# Patient Record
Sex: FEMALE | Race: BLACK OR AFRICAN AMERICAN | NOT HISPANIC OR LATINO | Employment: UNEMPLOYED | URBAN - METROPOLITAN AREA
[De-identification: names, ages, dates, MRNs, and addresses within clinical notes are randomized per-mention and may not be internally consistent; named-entity substitution may affect disease eponyms.]

---

## 2017-04-07 ENCOUNTER — HOSPITAL ENCOUNTER (EMERGENCY)
Facility: OTHER | Age: 27
Discharge: HOME | End: 2017-04-07
Attending: EMERGENCY MEDICINE
Payer: MEDICAID

## 2017-04-07 VITALS
RESPIRATION RATE: 16 BRPM | DIASTOLIC BLOOD PRESSURE: 82 MMHG | OXYGEN SATURATION: 99 % | WEIGHT: 177 LBS | HEART RATE: 95 BPM | SYSTOLIC BLOOD PRESSURE: 140 MMHG | TEMPERATURE: 98 F | HEIGHT: 63 IN | BODY MASS INDEX: 31.36 KG/M2

## 2017-04-07 DIAGNOSIS — L20.9 ATOPIC DERMATITIS, UNSPECIFIED TYPE: Primary | ICD-10-CM

## 2017-04-07 LAB
B-HCG UR QL: NEGATIVE
CTP QC/QA: YES

## 2017-04-07 PROCEDURE — 99283 EMERGENCY DEPT VISIT LOW MDM: CPT | Mod: 25

## 2017-04-07 PROCEDURE — 81025 URINE PREGNANCY TEST: CPT | Performed by: EMERGENCY MEDICINE

## 2017-04-07 RX ORDER — TRIAMCINOLONE ACETONIDE 5 MG/G
OINTMENT TOPICAL 2 TIMES DAILY
Qty: 15 G | Status: SHIPPED | OUTPATIENT
Start: 2017-04-07 | End: 2017-04-17

## 2017-04-07 NOTE — ED PROVIDER NOTES
"Encounter Date: 4/7/2017    SCRIBE #1 NOTE: I, Spenser Salehinz, am scribing for, and in the presence of,  Dr. King. I have scribed the entire note.       History     Chief Complaint   Patient presents with    Rash     + dry rash noted to left 4th finger x 1 month. " I used the cream my primary doctor gave me and it has not helped". Denies fever or chills     Review of patient's allergies indicates:  No Known Allergies  HPI Comments: Time seen by provider: 5:07 PM    This is a 27 y.o. female who presents with complaint of a painful rash to her left ring finger starting 2 months ago. She quit wearing her ring after onset. She was prescribed a cream by her PCP that she used without improvement. She has not seen a dermatologist. The pain is worse with pressure and movement of her finger. There is occasional clear discharge from the site. She denies fever, numbness and weakness.    The history is provided by the patient.     Past Medical History:   Diagnosis Date    Herpes simplex virus (HSV) infection      Past Surgical History:   Procedure Laterality Date    CYST REMOVAL      right leg     History reviewed. No pertinent family history.  Social History   Substance Use Topics    Smoking status: Never Smoker    Smokeless tobacco: None    Alcohol use No     Review of Systems   Constitutional: Negative for chills, diaphoresis and fever.   HENT: Negative for congestion and sore throat.    Eyes: Negative for pain.   Respiratory: Negative for shortness of breath.    Cardiovascular: Negative for chest pain.   Gastrointestinal: Negative for diarrhea, nausea and vomiting.   Genitourinary: Negative for dysuria.   Musculoskeletal: Negative for myalgias.   Skin: Positive for rash.   Neurological: Negative for weakness, numbness and headaches.   Psychiatric/Behavioral: Negative for behavioral problems and confusion.       Physical Exam   Initial Vitals   BP Pulse Resp Temp SpO2   04/07/17 1642 04/07/17 1642 04/07/17 1642 " 04/07/17 1642 04/07/17 1642   140/82 95 16 97.6 °F (36.4 °C) 99 %     Physical Exam    Nursing note and vitals reviewed.  Constitutional: She appears well-developed and well-nourished. She is not diaphoretic. No distress.   HENT:   Head: Normocephalic and atraumatic.   Eyes: Right eye exhibits no discharge. Left eye exhibits no discharge.   Neck: Normal range of motion. Neck supple.   Cardiovascular: Normal rate, regular rhythm, normal heart sounds and intact distal pulses. Exam reveals no gallop and no friction rub.    No murmur heard.  Pulmonary/Chest: Breath sounds normal. No respiratory distress. She has no wheezes. She has no rhonchi. She has no rales.   Musculoskeletal: Normal range of motion. She exhibits no edema or tenderness.   Neurological: She is alert and oriented to person, place, and time. She has normal strength. No sensory deficit.   Skin: Skin is warm. No rash noted. No pallor.   Left ring finger with a cracked scaling rash along the dorsal aspect. No pustules. Increased scaling over the DIP of the right middle finger.    Psychiatric: She has a normal mood and affect. Her behavior is normal. Judgment and thought content normal.         ED Course   Procedures  Labs Reviewed   POCT URINE PREGNANCY             Medical Decision Making:   Clinical Tests:   Lab Tests: Ordered and Reviewed  ED Management:  #27-year-old female with rash to her left ring finger.  Appears to be eczema.  We'll treat with high-dose triamcinolone and refer to dermatology. Patient discharged home in stable condition. Diagnosis and treatment plan explained to patient. I have answered all questions and the patient is satisfied with the plan of care.            Scribe Attestation:   Scribe #1: I performed the above scribed service and the documentation accurately describes the services I performed. I attest to the accuracy of the note.    Attending Attestation:           Physician Attestation for Scribe:  Physician Attestation  Statement for Scribe #1: I, Dr. King, reviewed documentation, as scribed by Spenser Dumont in my presence, and it is both accurate and complete.                 ED Course     Clinical Impression:     1. Atopic dermatitis, unspecified type               Pranay King, DO  04/07/17 1805

## 2017-04-07 NOTE — ED TRIAGE NOTES
"Pt reports about 2.5 months ago when wearing her wedding ring she started noticing a dark colored, dry rash noted to left 4th finger that is itchy, inflamed, cracked and drains a clear liquid. " I used the cream my primary doctor gave me and it has not helped". Denies fever or chills; rash is starting to spread to left 5th finger and some areas to the right fingers  "

## 2017-04-07 NOTE — ED AVS SNAPSHOT
[facility]  [address]  [place] [address]               Gwendolyn Urbano   2017  4:53 PM   ED    Description:  Female : 1990   Department:  [facility]           Your Care was Coordinated By:     Provider Role From To    Pranay King DO Attending Provider 17 0796 --      Reason for Visit     Rash           Diagnoses this Visit        Comments    Atopic dermatitis, unspecified type    -  Primary       ED Disposition     None           To Do List           Follow-up Information     Follow up with [facility] - Dermatology In 1 week.    Specialty:  Dermatology    Contact information:    [address]  [facility] [address] [phone]    Additional information:    [facility] [place], [address]th Floor - [address]      Referral Details     Referred By    Referred To    Pranay King DO   [address] [place] [address] [address] [address]   Phone:  [phone]   Fax:  [phone]    Order:  Ambulatory Referral To Dermatology   Reason:  Specialty Services Required           These Medications        Disp Refills Start End    triamcinolone (KENALOG) 0.5 % ointment 15 g g 2017    Apply topically 2 (two) times daily. - Topical (Top)      [facility]     [facility] On Call Nurse Care Line -  Assistance  Unless otherwise directed by your provider, please contact [facility], our nurse care line that is available for  assistance.     Registered nurses in the [facility] provide: appointment scheduling, clinical advisement, health education, and other advisory services.  Call: [phone] (toll free)               Medications           Message regarding Medications     Verify the changes and/or additions to your medication regime listed below are the same as discussed with your clinician today.  If any of these changes or additions are incorrect, please notify your healthcare provider.       "  START taking these NEW medications        Refills    triamcinolone (KENALOG) 0.5 % ointment g    Sig: Apply topically 2 (two) times daily.    Class: Print    Route: Topical (Top)           Verify that the below list of medications is an accurate representation of the medications you are currently taking.  If none reported, the list may be blank. If incorrect, please contact your healthcare provider. Carry this list with you in case of emergency.           Current Medications     triamcinolone (KENALOG) 0.5 % ointment Apply topically 2 (two) times daily.           Clinical Reference Information           Your Vitals Were     BP Pulse Temp Resp Height Weight    140/82 (BP Location: Left arm, Patient Position: Sitting) 95 97.6 °F (36.4 °C) (Oral) 16 5' 3" (1.6 m) 80.3 kg (177 lb)    Last Period SpO2 BMI          02/28/2017 99% 31.35 kg/m2        Allergies as of 4/7/2017     No Known Allergies      Immunizations Administered on Date of Encounter - 4/7/2017     None      ED Micro, Lab, POCT     Start Ordered       Status Ordering Provider    04/07/17 1644 04/07/17 1643  POCT urine pregnancy  Once      Final result       ED Imaging Orders     None        Discharge Instructions       Put ointment on and then wear glove at night to increase the effectiveness. Follow up with dermatology.    Discharge References/Attachments     ATOPIC DERMATITIS (ECZEMA) (ENGLISH)      [facility] Sign-Up     Activating your [facility] account is as easy as 1-2-3!     1) Visit [email or web address], select Sign Up Now, enter this activation code and your date of birth, then select Next.  [email or web address]  Expires: 5/22/2017  5:17 PM      2) Create a username and password to use when you visit [facility] in the future and select a security question in case you lose your password and select Next.    3) Enter your e-mail address and click Sign Up!    Additional Information  If you have questions, please e-mail [email or web address] or call [phone] " to talk to our [facility] staff. Remember, [facility] is NOT to be used for urgent needs. For medical emergencies, dial 911.          [facility] complies with applicable Federal civil rights laws and does not discriminate on the basis of race, color, national origin, age, disability, or sex.        Language Assistance Services     ATTENTION: Language assistance services are available, free of charge. Please call [phone].      ATENCIÓN: Si habla español, tiene a wei disposición servicios gratuitos de asistencia lingüística. Llame al [phone].     CHÚ Ý: N?u b?n nói Ti?ng Vi?t, có các d?ch v? h? tr? ngôn ng? mi?n phí dành cho b?n. G?i s? [phone].

## 2017-04-07 NOTE — DISCHARGE INSTRUCTIONS
Put ointment on and then wear glove at night to increase the effectiveness. Follow up with dermatology.

## 2017-04-10 NOTE — PLAN OF CARE
ON 4/10 @ 12 NOON, CALLED TO CLARIFY ID [facility] ACCEPTED MEDICAID INSURANCE. THEY DO NOT PER THE OFFICE.     PT CALLED ME THIS MORNING AND STATED SHE CALLED [facility] THIS MORNING AND WAS GIVEN A FAX NUMBER [phone]. THIS FAX NUMBER IS FOR [facility]. INFORMED PATIENT OF THIS INFORMATION.     CALL [facility], THEY DO NOT HAVE THE REFERRAL. RE-FAXED THE REFERRAL AND INFORMED PATIENT THEY WOULD BE CALLING IN THE NEXT DAY OR SO WITH AN APPOINTMENT DATE/TIME.     NEXT AVAILABLE WITHOUT A REFERRAL WOULD BE AT THE END OF MAY.

## 2018-05-04 ENCOUNTER — HOSPITAL ENCOUNTER (EMERGENCY)
Facility: OTHER | Age: 28
Discharge: HOME | End: 2018-05-04
Attending: OBSTETRICS & GYNECOLOGY
Payer: MEDICAID

## 2018-05-04 VITALS
SYSTOLIC BLOOD PRESSURE: 114 MMHG | RESPIRATION RATE: 18 BRPM | HEART RATE: 79 BPM | TEMPERATURE: 98 F | OXYGEN SATURATION: 98 % | DIASTOLIC BLOOD PRESSURE: 85 MMHG

## 2018-05-04 DIAGNOSIS — S80.02XA CONTUSION OF LEFT KNEE, INITIAL ENCOUNTER: ICD-10-CM

## 2018-05-04 DIAGNOSIS — M25.562 LEFT KNEE PAIN: ICD-10-CM

## 2018-05-04 DIAGNOSIS — O09.33 LIMITED PRENATAL CARE IN THIRD TRIMESTER: Primary | ICD-10-CM

## 2018-05-04 PROCEDURE — 59025 FETAL NON-STRESS TEST: CPT

## 2018-05-04 PROCEDURE — 99283 EMERGENCY DEPT VISIT LOW MDM: CPT | Mod: 25,,, | Performed by: OBSTETRICS & GYNECOLOGY

## 2018-05-04 PROCEDURE — 99284 EMERGENCY DEPT VISIT MOD MDM: CPT | Mod: 25

## 2018-05-04 PROCEDURE — 59025 FETAL NON-STRESS TEST: CPT | Mod: 26,,, | Performed by: OBSTETRICS & GYNECOLOGY

## 2018-05-05 NOTE — ED PROVIDER NOTES
Encounter Date: 2018       History     Chief Complaint   Patient presents with    Knee Pain     Gwendolyn Urbano is a 28 y.o. [id number] at unknown [place] presents for knee pain since last night. Patient hit her knee on the side of her bed last night. Patient states since then she cannot bear weight on the leg. Denies any bruising. This IUP is complicated by lack of prental care, homebirth. Patient states she has not seen a physician this pregnancy. History of 2 prior SVDs at home. Denies PMH, PSH. Patient denies contractions, denies vaginal bleeding, denies LOF.   Fetal Movement: normal.          Review of patient's allergies indicates:  No Known Allergies  Past Medical History:   Diagnosis Date    Herpes simplex virus (HSV) infection      Past Surgical History:   Procedure Laterality Date    CYST REMOVAL      right leg     No family history on file.  Social History   Substance Use Topics    Smoking status: Never Smoker    Smokeless tobacco: Not on file    Alcohol use No     Review of Systems   Constitutional: Negative for chills, fatigue and fever.   HENT: Negative for congestion.    Eyes: Negative for visual disturbance.   Respiratory: Negative for cough and shortness of breath.    Cardiovascular: Negative for chest pain and palpitations.   Gastrointestinal: Negative for abdominal distention, constipation, diarrhea, nausea and vomiting.   Genitourinary: Negative for difficulty urinating, dysuria, hematuria, vaginal bleeding and vaginal discharge.   Musculoskeletal: Positive for joint swelling and myalgias.   Skin: Negative for rash.   Neurological: Negative for dizziness, seizures, light-headedness and headaches.   Hematological: Does not bruise/bleed easily.   Psychiatric/Behavioral: Negative for dysphoric mood. The patient is not nervous/anxious.        Physical Exam     Initial Vitals   BP Pulse Resp Temp SpO2   18 1848 18 1848 18 1848 18 1848 18 1850   114/85 100 18 98.1 °F  (36.7 °C) 98 %      MAP       05/04/18 1848       94.67         Physical Exam    Vitals reviewed.  Constitutional: She appears well-developed and well-nourished.   Cardiovascular: Normal rate and regular rhythm.   Pulmonary/Chest: Breath sounds normal. No respiratory distress.   Abdominal: Soft. She exhibits no distension. There is no tenderness.   Gravid     Musculoskeletal: Normal range of motion. She exhibits no edema.   Patient difficulty extending left knee can flex fully. Point tenderness to knee of lateral side   Neurological: She is alert and oriented to person, place, and time.   Skin: Skin is warm and dry.   Psychiatric: She has a normal mood and affect. Her behavior is normal. Judgment and thought content normal.         ED Course   Obtain Fetal nonstress test (NST)  Date/Time: 5/4/2018 7:42 PM  Performed by: PATRICIA DUTTA  Authorized by: PATRICIA DUTTA     Nonstress Test:     Variability:  6-25 BPM    Decelerations:  Variable    Accelerations:  15 bpm    Baseline:  140    Uterine Irritability: No      Contractions:  Not present  Biophysical Profile:     Nonstress Test Interpretation: reactive      Overall Impression:  Reassuring      Labs Reviewed - No data to display          Medical Decision Making:   ED Management:  NST reactive and reassuring  XRAY negative for any fracture  To f/u with ortho if continues to have pain or difficulty with bearing weight  Ice packs and tylenol discussed with patient  Patient refused all blood draws  Patient states she refuses because she plans on delivering at home  Discussed that we do not recommend that and that is against medical advice  Scan vertex, placenta fundal, 36wk by FL                      Clinical Impression:   The primary encounter diagnosis was Limited prenatal care in third trimester. Diagnoses of Left knee pain and Contusion of left knee, initial encounter were also pertinent to this visit.

## 2018-05-05 NOTE — ED NOTES
Pt given discharge instructions, verbally and written. Pt verbalized understanding. All questions answered, no further questions at this time. Pt discharged to home.

## 2022-10-02 ENCOUNTER — HOSPITAL ENCOUNTER (EMERGENCY)
Facility: OTHER | Age: 32
Discharge: HOME | End: 2022-10-02
Attending: EMERGENCY MEDICINE
Payer: MEDICAID

## 2022-10-02 VITALS
WEIGHT: 199.5 LBS | SYSTOLIC BLOOD PRESSURE: 120 MMHG | TEMPERATURE: 98 F | RESPIRATION RATE: 18 BRPM | DIASTOLIC BLOOD PRESSURE: 78 MMHG | BODY MASS INDEX: 35.34 KG/M2 | OXYGEN SATURATION: 99 % | HEART RATE: 78 BPM

## 2022-10-02 DIAGNOSIS — J01.00 ACUTE MAXILLARY SINUSITIS, RECURRENCE NOT SPECIFIED: Primary | ICD-10-CM

## 2022-10-02 DIAGNOSIS — Z20.828 EXPOSURE TO INFLUENZA: ICD-10-CM

## 2022-10-02 LAB
B-HCG UR QL: NEGATIVE
CTP QC/QA: YES
POC MOLECULAR INFLUENZA A AGN: NEGATIVE
POC MOLECULAR INFLUENZA B AGN: NEGATIVE
SARS-COV-2 RDRP RESP QL NAA+PROBE: NEGATIVE

## 2022-10-02 PROCEDURE — 81025 URINE PREGNANCY TEST: CPT | Performed by: NURSE PRACTITIONER

## 2022-10-02 PROCEDURE — 99284 EMERGENCY DEPT VISIT MOD MDM: CPT

## 2022-10-02 PROCEDURE — U0002 COVID-19 LAB TEST NON-CDC: HCPCS | Performed by: EMERGENCY MEDICINE

## 2022-10-02 RX ORDER — AMOXICILLIN AND CLAVULANATE POTASSIUM 875; 125 MG/1; MG/1
1 TABLET, FILM COATED ORAL EVERY 12 HOURS
Qty: 14 TABLET | Refills: 0 | Status: SHIPPED | OUTPATIENT
Start: 2022-10-02 | End: 2022-10-09

## 2022-10-02 RX ORDER — PROMETHAZINE HYDROCHLORIDE AND DEXTROMETHORPHAN HYDROBROMIDE 6.25; 15 MG/5ML; MG/5ML
5 SYRUP ORAL EVERY 6 HOURS PRN
Qty: 120 ML | Refills: 0 | Status: SHIPPED | OUTPATIENT
Start: 2022-10-02 | End: 2022-10-12

## 2022-10-02 RX ORDER — FLUTICASONE PROPIONATE 50 MCG
2 SPRAY, SUSPENSION (ML) NASAL DAILY
Qty: 9.9 ML | Refills: 0 | Status: SHIPPED | OUTPATIENT
Start: 2022-10-02

## 2022-10-03 NOTE — ED PROVIDER NOTES
Encounter Date: 10/2/2022       History     Chief Complaint   Patient presents with    Fever     C/o fever, cough, exposed to flu via children     Chief complaint:  Flu exposure    32-year-old female presents for evaluation of URI symptoms x1 week.  Children were exposed to fluid school.  Multiple family members also with URI symptoms and fever.  Patient reports nasal congestion, sinus pressure (worse on the left), runny nose, and cough.  Afebrile in triage.  No respiratory distress.    This is the extent of patient's complaints for this ER encounter.     The history is provided by the patient.   Review of patient's allergies indicates:  No Known Allergies  Past Medical History:   Diagnosis Date    Herpes simplex virus (HSV) infection      Past Surgical History:   Procedure Laterality Date    CYST REMOVAL      right leg     No family history on file.  Social History     Tobacco Use    Smoking status: Never   Substance Use Topics    Alcohol use: No    Drug use: No     Review of Systems   Constitutional:  Negative for fever.   HENT:  Positive for congestion, rhinorrhea and sinus pressure. Negative for sore throat.    Respiratory:  Positive for cough. Negative for shortness of breath.    Cardiovascular:  Negative for chest pain.   Gastrointestinal:  Negative for abdominal pain.   Genitourinary:  Negative for difficulty urinating.   Musculoskeletal:  Negative for arthralgias, back pain, myalgias and neck pain.   Skin:  Negative for rash and wound.   Neurological:  Negative for weakness and headaches.     Physical Exam     Initial Vitals [10/02/22 2040]   BP Pulse Resp Temp SpO2   120/82 84 18 98.2 °F (36.8 °C) 97 %      MAP       --         Physical Exam    Vitals reviewed.  Constitutional: No distress.   HENT:   Head: Normocephalic and atraumatic.   Right Ear: Tympanic membrane and ear canal normal.   Left Ear: Tympanic membrane and ear canal normal.   Nose: Rhinorrhea and sinus tenderness present. Left sinus exhibits  maxillary sinus tenderness.   Mouth/Throat: Mucous membranes are normal. Posterior oropharyngeal erythema (mild) present. No oropharyngeal exudate, posterior oropharyngeal edema or tonsillar abscesses.   Clear post nasal drip noted. Bilateral nasal mucosa with nasal discharge noted.   Eyes: Conjunctivae, EOM and lids are normal. Right pupil is round. Left pupil is round. Pupils are equal.   Cardiovascular:  Normal rate, regular rhythm and normal heart sounds.           Pulmonary/Chest: Effort normal and breath sounds normal. No respiratory distress.   Musculoskeletal:         General: Normal range of motion.     Neurological: She is alert and oriented to person, place, and time. She has normal strength.   Skin: Skin is warm and dry. No rash noted.   Psychiatric: She has a normal mood and affect. Her speech is normal and behavior is normal.       ED Course   Procedures  Labs Reviewed   SARS-COV-2 RDRP GENE   POCT INFLUENZA A/B MOLECULAR   POCT URINE PREGNANCY          Imaging Results    None          Medications - No data to display  Medical Decision Making:   Initial Assessment:   32-year-old female presents for evaluation of URI symptoms with flu exposure.  Patient reports sinus pressure, worse on the left.  Afebrile in triage.  Clinical Tests:   Lab Tests: Ordered  ED Management:  Influenza negative.  COVID negative.  Vital signs are stable.  Patient reports significant sinus pressure, worse on the left.  Tenderness present with palpation over sinuses.  No signs of facial/periorbital cellulitis.  Will treat with Augmentin discharge.  Flonase and promethazine DM also prescribed.    Patient/caregiver voices understanding and feels comfortable with discharge plan.    The patient/caregiver acknowledges that close follow up with medical provider is required. Instructed to follow up with PCP within 2 days. Patient/caregiver was given specific return precautions. The patient/caregiver agrees to comply with all  instruction and directions given in the ER.                ED Course as of 10/02/22 2303   Sun Oct 02, 2022   2115 Temp: 98.2 °F (36.8 °C) [EW]   2115 POC Molecular Influenza A Ag: Negative [EW]   2115 POC Molecular Influenza B Ag: Negative [EW]   2206 SARS-CoV-2 RNA, Amplification, Qual: Negative [EW]      ED Course User Index  [EW] Gladys Martins NP                 Clinical Impression:   Final diagnoses:  [Z20.828] Exposure to influenza  [J01.00] Acute maxillary sinusitis, recurrence not specified (Primary)      ED Disposition Condition    Discharge Stable          ED Prescriptions       Medication Sig Dispense Start Date End Date Auth. Provider    amoxicillin-clavulanate 875-125mg (AUGMENTIN) 875-125 mg per tablet Take 1 tablet by mouth every 12 (twelve) hours. for 7 days 14 tablet 10/2/2022 10/9/2022 Gladys Martins NP    fluticasone propionate (FLONASE) 50 mcg/actuation nasal spray 2 sprays (100 mcg total) by Each Nostril route once daily. 9.9 mL 10/2/2022 -- Gladys Martins NP    promethazine-dextromethorphan (PROMETHAZINE-DM) 6.25-15 mg/5 mL Syrp Take 5 mLs by mouth every 6 (six) hours as needed. 120 mL 10/2/2022 10/12/2022 Gladys Martins NP          Follow-up Information       Follow up With Specialties Details Why Contact Info    Primary care  Schedule an appointment as soon as possible for a visit in 2 days               Gladys Martins NP  10/02/22 2303

## 2022-10-04 ENCOUNTER — HOSPITAL ENCOUNTER (EMERGENCY)
Facility: OTHER | Age: 32
Discharge: HOME | End: 2022-10-04
Attending: EMERGENCY MEDICINE
Payer: MEDICAID

## 2022-10-04 VITALS
HEART RATE: 105 BPM | HEIGHT: 62 IN | BODY MASS INDEX: 38.64 KG/M2 | RESPIRATION RATE: 16 BRPM | TEMPERATURE: 99 F | WEIGHT: 210 LBS | OXYGEN SATURATION: 95 % | DIASTOLIC BLOOD PRESSURE: 74 MMHG | SYSTOLIC BLOOD PRESSURE: 119 MMHG

## 2022-10-04 DIAGNOSIS — J32.9 SINUSITIS, UNSPECIFIED CHRONICITY, UNSPECIFIED LOCATION: Primary | ICD-10-CM

## 2022-10-04 LAB — GROUP A STREP, MOLECULAR: NEGATIVE

## 2022-10-04 PROCEDURE — 87651 STREP A DNA AMP PROBE: CPT | Performed by: EMERGENCY MEDICINE

## 2022-10-04 PROCEDURE — 99284 EMERGENCY DEPT VISIT MOD MDM: CPT | Mod: 25

## 2022-10-04 RX ORDER — LORATADINE 10 MG/1
10 TABLET ORAL DAILY
Qty: 30 TABLET | Refills: 0 | Status: SHIPPED | OUTPATIENT
Start: 2022-10-04 | End: 2023-10-04

## 2022-10-04 RX ORDER — METHYLPREDNISOLONE 4 MG/1
TABLET ORAL
Qty: 21 EACH | Refills: 0 | Status: SHIPPED | OUTPATIENT
Start: 2022-10-04 | End: 2022-10-25

## 2022-10-05 NOTE — ED PROVIDER NOTES
Encounter Date: 10/4/2022    SCRIBE #1 NOTE: I, Ciro Fajardo am scribing for, and in the presence of,  Jesse Bernardo II, MD. I have scribed the following portions of the note - Other sections scribed: HPI, ROS, PE.     History     Chief Complaint   Patient presents with    Oral Swelling     Pt c/o feeling of swelling in back of throat that has been worsening over past 2 days.  Pt was seen on 10/2 for same s/s      Time seen by provider: 10:00 PM    This is a 32 y.o. female who presents with complaint of a worsening sore throat over the past week. Patient describes a sensation of swelling and difficulty closing her mouth. She was seen here two days ago and started on Amoxicillin as well as Flonase with no relief as of yet. Associated symptoms include cough, runny nose, congestion, and left eye drainage. Patient has not taken any OTC medications to alleviate her symptoms. She denies any fever. No PMHx. No daily medications. No PSHx of tonsillectomy. No SHx of alcohol, tobacco, or illicit drug use. No PCP.    The history is provided by the patient.   Review of patient's allergies indicates:  No Known Allergies  Past Medical History:   Diagnosis Date    Herpes simplex virus (HSV) infection      Past Surgical History:   Procedure Laterality Date    CYST REMOVAL      right leg     No family history on file.  Social History     Tobacco Use    Smoking status: Never   Substance Use Topics    Alcohol use: No    Drug use: No     Review of Systems   Constitutional:  Negative for fever.   HENT:  Positive for congestion, rhinorrhea and sore throat.    Eyes:  Positive for discharge.   Respiratory:  Negative for shortness of breath.    Cardiovascular:  Negative for chest pain.   Gastrointestinal:  Negative for nausea.   Genitourinary:  Negative for dysuria.   Musculoskeletal:  Negative for back pain.   Skin:  Negative for rash.   Neurological:  Negative for weakness.   Hematological:  Does not bruise/bleed easily.     Physical  Exam     Initial Vitals [10/04/22 2050]   BP Pulse Resp Temp SpO2   107/73 106 18 98.7 °F (37.1 °C) 96 %      MAP       --         Physical Exam    Nursing note and vitals reviewed.  Constitutional: She appears well-developed and well-nourished.   HENT:   Head: Normocephalic and atraumatic.   Right Ear: External ear normal.   Left Ear: External ear normal.   Nose: Nose normal.   Mouth/Throat: Oropharynx is clear and moist. No oropharyngeal exudate, posterior oropharyngeal edema or posterior oropharyngeal erythema.   Turbinates inflamed. Tonsils are not erythematous or edematous. Small tonsillolith on the right. No exudate.    Eyes: Conjunctivae are normal.   No injection or drainage.   Neck: Neck supple.   Cardiovascular:  Normal rate, regular rhythm and normal heart sounds.     Exam reveals no gallop and no friction rub.       No murmur heard.  Pulmonary/Chest: Breath sounds normal. No respiratory distress. She has no wheezes. She has no rhonchi. She has no rales.   Musculoskeletal:         General: No edema.      Cervical back: Neck supple.     Lymphadenopathy:     She has no cervical adenopathy.   Neurological: She is alert and oriented to person, place, and time.   Skin: Skin is warm and dry.   Psychiatric: She has a normal mood and affect.       ED Course   Procedures  Labs Reviewed   GROUP A STREP, MOLECULAR          Imaging Results    None          Medications - No data to display  Medical Decision Making:   History:   Old Medical Records: I decided to obtain old medical records.  Clinical Tests:   Lab Tests: Ordered and Reviewed        Scribe Attestation:   Scribe #1: I performed the above scribed service and the documentation accurately describes the services I performed. I attest to the accuracy of the note.            Patient presents complaining of sore throat, sensation swelling now difficulty/closing mouth however and she does not have any evidence of trismus or limited jaw movement.  She is  well-appearing.  Posterior pharynx is unremarkable.  She also has symptoms of runny nose nasal congestion, has been seen recently for similar symptoms.  She is on Flonase and antibiotics.  We will add Claritin, recommend over-the-counter Mucinex and placed on Medrol Dosepak for improved symptom relief.  Encouraged follow-up with primary care, especially symptoms persist.  Return precautions discussed for the interim.     Physician Attestation for Scribe: IKALEB, reviewed documentation as scribed in my presence, which is both accurate and complete.    Clinical Impression:   Final diagnoses:  [J32.9] Sinusitis, unspecified chronicity, unspecified location (Primary)      ED Disposition Condition    Discharge Stable          ED Prescriptions       Medication Sig Dispense Start Date End Date Auth. Provider    loratadine (CLARITIN) 10 mg tablet Take 1 tablet (10 mg total) by mouth once daily. 30 tablet 10/4/2022 10/4/2023 Jesse Bernardo II, MD    methylPREDNISolone (MEDROL DOSEPACK) 4 mg tablet use as directed 21 each 10/4/2022 10/25/2022 Jesse Bernardo II, MD          Follow-up Information    None          Jesse Bernardo II, MD  10/05/22 2184

## 2022-10-05 NOTE — FIRST PROVIDER EVALUATION
Emergency Department TeleTriage Encounter Note      CHIEF COMPLAINT    Chief Complaint   Patient presents with    Oral Swelling     Pt c/o feeling of swelling in back of throat that has been worsening over past 2 days.  Pt was seen on 10/2 for same s/s        VITAL SIGNS   Initial Vitals [10/04/22 2050]   BP Pulse Resp Temp SpO2   107/73 106 18 98.7 °F (37.1 °C) 96 %      MAP       --            ALLERGIES    Review of patient's allergies indicates:  No Known Allergies    PROVIDER TRIAGE NOTE  This is a teletriage evaluation of a 32 y.o. female presenting to the ED with c/o I cant close my mouth theres like a blockage on my throat. .     ROS: (-) fever, (-) rash  PE:  tolerating her own secretions.     Initial orders will be placed and care will be transferred to an alternate provider when patient is roomed for a full evaluation. Any additional orders and the final disposition will be determined by that provider.         ORDERS  Labs Reviewed   GROUP A STREP, MOLECULAR       ED Orders (720h ago, onward)      Start Ordered     Status Ordering Provider    10/04/22 2123 10/04/22 2122  POCT urine pregnancy  Once         Ordered OVIDIO ROJAS    10/04/22 2123 10/04/22 2122  POCT COVID-19 Rapid Screening  Once         Ordered OVIDIO ROJAS    10/04/22 2123 10/04/22 2122  POCT Influenza A/B Molecular  Once         Ordered OVIDIO ROJAS    10/04/22 2123 10/04/22 2122  Group A Strep, Molecular  STAT         Ordered OVIDIO ROJAS    10/04/22 2054 10/04/22 2053  Group A Strep, Molecular  STAT         Final result BARTOLOME LOPEZ II              Virtual Visit Note: The provider triage portion of this emergency department evaluation and documentation was performed via [employer], a HIPAA-compliant telemedicine application, in concert with a tele-presenter in the room. A face to face patient evaluation with one of my colleagues will occur once the patient is placed in an emergency department  room.      DISCLAIMER: This note was prepared with [employer] voice recognition transcription software. Garbled syntax, mangled pronouns, and other bizarre constructions may be attributed to that software system.

## 2024-02-14 ENCOUNTER — HOSPITAL ENCOUNTER (EMERGENCY)
Facility: OTHER | Age: 34
Discharge: HOME | End: 2024-02-14
Attending: EMERGENCY MEDICINE
Payer: MEDICAID

## 2024-02-14 VITALS
HEART RATE: 83 BPM | TEMPERATURE: 98 F | DIASTOLIC BLOOD PRESSURE: 81 MMHG | OXYGEN SATURATION: 98 % | SYSTOLIC BLOOD PRESSURE: 130 MMHG | HEIGHT: 63 IN | BODY MASS INDEX: 33.66 KG/M2 | RESPIRATION RATE: 16 BRPM | WEIGHT: 190 LBS

## 2024-02-14 DIAGNOSIS — J02.0 STREP PHARYNGITIS: Primary | ICD-10-CM

## 2024-02-14 LAB
B-HCG UR QL: NEGATIVE
CTP QC/QA: YES
GROUP A STREP, MOLECULAR: POSITIVE

## 2024-02-14 PROCEDURE — 99283 EMERGENCY DEPT VISIT LOW MDM: CPT

## 2024-02-14 PROCEDURE — 81025 URINE PREGNANCY TEST: CPT | Performed by: NURSE PRACTITIONER

## 2024-02-14 PROCEDURE — 25000003 PHARM REV CODE 250: Performed by: NURSE PRACTITIONER

## 2024-02-14 PROCEDURE — 87651 STREP A DNA AMP PROBE: CPT | Performed by: EMERGENCY MEDICINE

## 2024-02-14 RX ORDER — ACETAMINOPHEN 500 MG
1000 TABLET ORAL EVERY 6 HOURS PRN
Qty: 30 TABLET | Refills: 0 | Status: SHIPPED | OUTPATIENT
Start: 2024-02-14

## 2024-02-14 RX ORDER — PENICILLIN V POTASSIUM 500 MG/1
500 TABLET, FILM COATED ORAL
Status: COMPLETED | OUTPATIENT
Start: 2024-02-14 | End: 2024-02-14

## 2024-02-14 RX ORDER — IBUPROFEN 600 MG/1
600 TABLET ORAL EVERY 6 HOURS PRN
Qty: 30 TABLET | Refills: 0 | Status: SHIPPED | OUTPATIENT
Start: 2024-02-14

## 2024-02-14 RX ORDER — IBUPROFEN 600 MG/1
600 TABLET ORAL
Status: COMPLETED | OUTPATIENT
Start: 2024-02-14 | End: 2024-02-14

## 2024-02-14 RX ORDER — ACETAMINOPHEN 500 MG
1000 TABLET ORAL
Status: COMPLETED | OUTPATIENT
Start: 2024-02-14 | End: 2024-02-14

## 2024-02-14 RX ORDER — PENICILLIN V POTASSIUM 500 MG/1
500 TABLET, FILM COATED ORAL 2 TIMES DAILY
Qty: 20 TABLET | Refills: 0 | Status: SHIPPED | OUTPATIENT
Start: 2024-02-14 | End: 2024-02-24

## 2024-02-14 RX ADMIN — ACETAMINOPHEN 1000 MG: 500 TABLET ORAL at 01:02

## 2024-02-14 RX ADMIN — IBUPROFEN 600 MG: 600 TABLET, FILM COATED ORAL at 01:02

## 2024-02-14 RX ADMIN — PENICILLIN V POTASSIUM 500 MG: 500 TABLET, FILM COATED ORAL at 01:02

## 2024-02-14 NOTE — ED PROVIDER NOTES
"     Source of History:  Patient    Chief complaint:  Sore Throat (Pt reports sore throat x 2 days, white exudate noted to bilateral tonsils. Pt also complaining of R neck pain x 10 days, came to ED but LWBS)      HPI:  Gwendolyn Urbano is a 34 y.o. female presenting with fever, chills, sore throat for the past 2 days.  She does have children in the home.  She took ibuprofen last night but nothing today.  Denies voice change or difficulty swallowing but does report painful swallowing.  Although triage note reports right-sided neck pain, patient did not report this complaint to me.    This is the extent to the patients complaints today here in the emergency department.    ROS: As per HPI    Review of patient's allergies indicates:  No Known Allergies    PMH:  As per HPI and below:  Past Medical History:   Diagnosis Date    Herpes simplex virus (HSV) infection      Past Surgical History:   Procedure Laterality Date    CYST REMOVAL      right leg       Social History     Tobacco Use    Smoking status: Never   Substance Use Topics    Alcohol use: No    Drug use: No       Physical Exam:    /74   Pulse 80   Temp 98.4 °F (36.9 °C) (Oral)   Resp 18   Ht 5' 3" (1.6 m)   Wt 86.2 kg (190 lb)   SpO2 99%   BMI 33.66 kg/m²   Nursing note and vital signs reviewed.  Appearance: Afebrile. Not toxic appearing. No acute distress.  Head: Atraumatic  Eyes: No conjunctival injection. No scleral icterus  ENT: Normal phonation.  No trismus.  Posterior oropharynx erythematous with mild tonsillar enlargement and white patchy patchy exudate.  Uvula midline.    Chest/ Respiratory: No respiratory distress. No accessory muscle use.  Cardiovascular: Regular rate   Abdomen:  Not distended.    Musculoskeletal: Good range of motion all joints.  No deformities.  Neck supple.  No meningismus.  Skin: No rashes seen.  Good turgor.  No ecchymoses.  Neurologic: GCS 15. Ambulates with a steady gait.   Mental Status:  Alert and oriented x 3.  " Appropriate, conversant    Labs that have been ordered have been independently reviewed and interpreted by myself.      Labs Reviewed   GROUP A STREP, MOLECULAR - Abnormal; Notable for the following components:       Result Value    Group A Strep, Molecular Positive (*)     All other components within normal limits   POCT URINE PREGNANCY       Imaging Results    None           Initial Impression/ Differential Dx:  Urgent evaluation of 34 y.o. female presenting with sore throat for the past 2 days. Patient is afebrile, not toxic appearing and hemodynamically stable. The patient has erythema with tonsillar edema exudate concerning for strep. There is no uvula deviation to suggest peritonsillar abscess. The patient has normal phonation with no trismus to suggest retropharyngeal abscess. There is no hoarseness, difficulty handling secretions, or facial swelling to suggest peritonsillar abscess, retropharyngeal abscess, epiglottitis, or airway compromise.  Will obtain rapid strep, given analgesics and reassess.     Differential Diagnosis includes, but is not limited to:  Joseph's angina, epiglottitis, foreign body aspiration, retropharyngeal abscess, peritonsillar abscess, esophageal perforation, mediastinitis, esophagitis, sialolithiasis, parotitis, laryngitis, tracheitis, dental/periapical abscess, TMJ disorder, pneumonia, Streptococcal/bacterial pharyngitis, viral pharyngitis.      MDM:        ED Course as of 02/14/24 1303   Wed Feb 14, 2024   1243 Group A Strep, Molecular(!): Positive  Will treat with antibiotics, Tylenol, NSAIDs.  Offered patient steroids she declines at this time.  Instructed her that she is contagious until she has had 2 doses of antibiotics and to follow-up with PCP as needed.  Patient is amenable to this plan and discharged home in good condition. Patient educated on signs and symptoms to monitor for and when to return to ED. Patient verbalized understanding agrees with treatment plan. All  questions and concerns addressed.      [CU]      ED Course User Index  [CU] Roosevelt Soto NP                 Diagnostic Impression:    1. Strep pharyngitis         ED Disposition Condition    Discharge Good            ED Prescriptions       Medication Sig Dispense Start Date End Date Auth. Provider    penicillin v potassium (VEETID) 500 MG tablet Take 1 tablet (500 mg total) by mouth 2 (two) times a day. for 10 days 20 tablet 2/14/2024 2/24/2024 Roosevelt Soto NP    acetaminophen (TYLENOL) 500 MG tablet Take 2 tablets (1,000 mg total) by mouth every 6 (six) hours as needed for Pain. 30 tablet 2/14/2024 -- Roosevelt Soto NP    ibuprofen (ADVIL,MOTRIN) 600 MG tablet Take 1 tablet (600 mg total) by mouth every 6 (six) hours as needed for Pain. 30 tablet 2/14/2024 -- Roosevelt Soto NP          Follow-up Information       Follow up With Specialties Details Why Contact Info    your primary care physician  In 3 days               Roosevelt Soto NP  02/14/24 [id number]

## 2024-02-14 NOTE — FIRST PROVIDER EVALUATION
Emergency Department TeleTriage Encounter Note      CHIEF COMPLAINT    Chief Complaint   Patient presents with    Sore Throat     Pt reports sore throat x 2 days, white exudate noted to bilateral tonsils. Pt also complaining of R neck pain x 10 days, came to ED but LWBS       VITAL SIGNS   Initial Vitals [02/14/24 1041]   BP Pulse Resp Temp SpO2   126/74 80 18 98.4 °F (36.9 °C) 99 %      MAP       --            ALLERGIES    Review of patient's allergies indicates:  No Known Allergies    PROVIDER TRIAGE NOTE  This is a teletriage evaluation of a 34 y.o. female presenting to the ED complaining of sore throat for two days.     Positive strep.  Voice normal, managing secretions.     Initial orders will be placed and care will be transferred to an alternate provider when patient is roomed for a full evaluation. Any additional orders and the final disposition will be determined by that provider.         ORDERS  Labs Reviewed   GROUP A STREP, MOLECULAR - Abnormal; Notable for the following components:       Result Value    Group A Strep, Molecular Positive (*)     All other components within normal limits   POCT URINE PREGNANCY       ED Orders (720h ago, onward)      Start Ordered     Status Ordering Provider    02/14/24 1115 02/14/24 1114  ibuprofen tablet 600 mg  ED 1 Time         Ordered KELSY OSULLIVAN N.    02/14/24 1114 02/14/24 1114  POCT urine pregnancy  Once         Ordered KELSY OSULLIVAN N.    02/14/24 1046 02/14/24 1046  Group A Strep, Molecular  STAT         Final result ISHAN LINDSEY              Virtual Visit Note: The provider triage portion of this emergency department evaluation and documentation was performed via [employer], a HIPAA-compliant telemedicine application, in concert with a tele-presenter in the room. A face to face patient evaluation with one of my colleagues will occur once the patient is placed in an emergency department room.      DISCLAIMER: This note was prepared  [facility]  Certificate of Child Health Examination  Student's Name:   Selwyn Purvis Birth Date  2011  Sex  male  Race/Ethnicity   School/Grade Level/ID#     Address:   [address] [place] [id number]  Parent/Guardian             Telephone#                                            Home:                               Work:   IMMUNIZATIONS: To be completed by health care provider. The mo/da/yr for every dose administered is required. If a specific vaccine is medically contraindicated, a separate written statement must be attached by the health care responsible for completing the health examination explaining the medical reason for the contraindication.      Immunization History   Administered Date(s) Administered   • DTaP(INFANRIX) 01/23/2013, 10/24/2015   • DTaP/HIB/IPV 2011, 03/14/2012, 04/25/2012   • HIB, Unspecified Formulation 04/25/2012   • Hep A, Pediatric, Unspecified Formulation 10/24/2014   • Hep A, ped/adol, 2 dose 01/23/2013, 11/10/2016   • Hep B, adolescent or pediatric 2011, 2011, 07/25/2012   • Hib (PRP-OMP) 01/23/2013   • Influenza, injectable, quadrivalent, preservative-free 12/27/2013, 10/24/2014, 11/10/2016, 10/29/2018   • Influenza, seasonal, injectable, trivalent 10/24/2012, 01/23/2013   • Measles Mumps Rubella Varicella 10/24/2012, 10/24/2015   • Pneumococcal Conjugate 13 valent 03/14/2012, 04/25/2012, 10/24/2012   • Pneumococcal Conjugate 7 Valent 2011   • Polio, INACTIVATED 10/24/2015   • Rotavirus - pentavalent 2011, 03/14/2012, 04/25/2012      Immunizations given 8/12/2021: None      Health care provider (MD, DO, APN, PA, school health professional, health official) verifying above immunization history must sign below. If adding dates to the above immunization history section, put your initials by date(s) and sign here.)  Signature                                                                 Title                                                 Date  _________________________________________MD__________________________8/12/21__________________  Signature                                                                 Title                                                Date  _____________________________________________________________________________________   ALTERNATIVE PROOF OF IMMUNITY   1. Clinical diagnosis (measles, mumps, hepatitis B) is allowed when verified by physician and supported with lab confirmation.  Attach copy of lab result.    * MEASLES (Rubeola)  MO   DA  YR          **MUMPS  MO   DA  YR             HEPATITIS B  MO   DA   YR           VARICELLA  MO DA  YR      2. History of varicella (chickenpox) disease is acceptable if verified by health care provider, school health professional or health official.  Person signing below verifies that the parent/guardian’s description of varicella disease history is indicative of past infection and is accepting such history as documentation of disease.  Date of Disease                                  Signature                                                           Title   3. Laboratory Evidence of Immunity (check one)      __ Measles*         __ Mumps**        __ Rubella        __Varicella    (Attach copy of lab result)         *All measles cases diagnosed on or after July 1, 2002, must be confirmed by laboratory evidence.      **All mumps cases diagnosed on or after July 1, 2013, must be confirmed by laboratory evidence.     Completion of Alternative 1 or 3 MUST be accompanied by Labs & Physician Signature: ___________________________  Physician Statements of Immunity MUST be submitted to ID for review.     Certificates of Tao[employer] Exemption to Immunizations or Physician Medical Statements of Medical Contraindication Are Reviewed   and Maintained by the School Authority     (11/2015)                                         (COMPLETE BOTH SIDES)                                   with [employer] voice recognition transcription software. Garbled syntax, mangled pronouns, and other bizarre constructions may be attributed to that software system.     Approved [facility] 3/2016      Student's Name:  Selwyn Purvis Birth Date 2011 Sex male School Grade Level/ID#     Page 2 of 2   HEALTH HISTORY      TO BE COMPLETED AND SIGNED BY PARENT/GUARDIAN AND VERIFIED BY HEALTH CARE PROVIDER  ALLERGIES: (Food, drug, insect, other) No is allergic to azithromycin.   MEDICATION: (List all prescribed or taken on a regular basis.)   No   Diagnosis of asthma?  Child wakes during night coughing? Yes    No  Yes    No  Loss of function of one of paired  organs?(eye/ear/kidney/testicle) Yes    No    Birth defects? Yes    No  Hospitalizations? Yes    No    Developmental delay? Yes    No   When? What for? Yes    No    Blood disorders? Hemophilia,  Sickle Cell, Other? Explain. Yes    No  Surgery? (List all.)  When? What for? Yes    No    Diabetes? Yes    No  Serious injury or illness? Yes    No    Head injury/Concussion/Passed out? Yes    No  TB skin test positive (past/present)? * Yes    No *If yes, refer to local [facility] dept   Seizures? What are they like?  Yes    No  TB disease (past or present)? * Yes    No *[facility] yes[facility]   Heart problem/Shortness of breath?  Yes    No  Tobacco use (type, frequency)?  Yes    No    Heart murmur/High blood pressure? Yes    No  Alcohol/Drug use?  Yes    No    Dizziness or chest pain with exercise? Yes    No  Family history of sudden death  before age 50? (Cause?) Yes    No    Eye/Vision problems? ______           __Glasses          __Contacts  Last exam by eye doctor ______  Other concerns? (crossed eye, drooping lids, squinting, difficulty reading) Dental?   __ Braces     __ Bridge     __ Plate   __Other   Ear/Hearing problems? Yes    No  Information may be shared with appropriate personnel for health and educational purposes.   Bone/Joint problem/injury/scoliosis? Yes    No  Parent/Guardian  Signature                                               Date   PHYSICAL EXAMINATION REQUIREMENTS   Entire section below to be completed by  MD/DO/SCARLET/PA Head Circumference if <2-3 years old - /75   Pulse 101   Resp 18   Ht 4' 5\" (1.346 m)   Wt (!) 52.7 kg (116 lb 1.2 oz)   BMI 29.05 kg/m²   BSA 1.35 m²    >99 %ile (Z= 2.39) based on CDC (Boys, 2-20 Years) BMI-for-age based on BMI available as of 8/12/2021.   DIABETES SCREENING (NOT REQUIRED FOR ) BMI>85% age/sex Yes     And any two of the following: Family History: No  Ethnic Minority: No    Signs of Insulin Resistance (hypertension, dyslipidemia, polycystic ovarian syndrome, acanthosis nigricans): No  At Risk: No   LEAD RISK QUESTIONNAIRE Required for children age 6 months through 6 years enrolled in licensed or public school operated day care, , nursery school and/or . (Blood test required if resides in [place] or high risk zip code.)  Questionnaire Administered? Yes  Blood Test Indicated? No   Blood Test Date _____   Blood Test Result ______________   TB SKIN OR BLOOD TEST Recommended only for children in high-risk groups including children immunosuppressed due to HIV infection or other conditions, frequent travel to or born in high prevalence countries or those exposed to adults in high-risk categories. See CDC guidelines. http://www.cdc.gov/tb/publications/factsheets/testing/TB_testing.htm.  Test Needed: No     Test performed: No                          Skin Test:    Date Read              /      /             Result:   Positive__      Negative__        mm________                          Blood Test: Date Reported       /      /               Result:  Positive__      Negative__      Value________  LAB TESTS (recommended) Date/Result  Date/Results   Hemoglobin or Hematocrit NA Sickle Cell (when indicated) NA   Urinalysis NA Developmental Screening Tool NA        SYSTEM REVIEW Normal  Comments/Follow-up/Needs  Normal Comments/Follow-up/Needs   Skin  Yes  Endocrine Yes    Ears Yes                  Screening Result Gastrointestinal Yes    Eyes Yes                   Screening Result: Genito-Urinary Yes                                            LMP   Nose Yes  Neurologic Yes    Throat Yes  Musculoskeletal Yes    Mouth/Dental Yes  Spinal Exam Yes    Cardiovascular/HTN Yes  Nutritional status Yes    Respiratory Yes Diagnosis of Asthma: No   Mental Health Yes    Currently Prescribed Asthma Medication:        No  Quick-relief medication (e.g. Short Acting Beta Antagonist)        No   Controller medication (e.g. inhaled corticosteroid) Other     NEEDS/MODIFICATIONS required in the school setting: No restrictions DIETARY Needs/Restrictions: No restrictions   SPECIAL INSTRUCTIONS/DEVICES e.g. safety glasses, glass eye, chest protector for arrhythmia, pacemaker, prosthetic device, dental bridge, false teeth, athletic support/cup: No restrictions   MENTAL HEALTH/OTHER Is there anything else the school should know about this student?  If you would like to discuss this student’s health with school or school health personnel:  Not needed   EMERGENCY ACTION needed while at school due to child’s health condition (e.g. ,seizures, asthma, insect sting, food, peanut allergy, bleeding problem, diabetes, heart problem)?   No   On the basis of the examination on this day, I approve this child’s participation in                                (If No or Modified please attach explanation.)  PHYSICAL EDUCATION:  Yes                               [employer]   Yes   Print Name  William Richards MD         Signature                                                                       Date: 8/12/2021   Address:  [employer] [address] [address] [phone]         (Complete Both Sides)     Approved [facility] 3/2016

## 2024-02-14 NOTE — ED TRIAGE NOTES
Sore throat for past 2-3 days with fever. Taking OTC meds without relief. Denies N/V, cough. Presents in no distress.

## 2024-02-14 NOTE — Clinical Note
"Gwendolyn Shafervanesa Urbano was seen and treated in our emergency department on 2/14/2024.  She may return to work on 02/16/2024.       If you have any questions or concerns, please don't hesitate to call.      Roosevelt Soto NP"

## 2024-11-18 ENCOUNTER — HOSPITAL ENCOUNTER (EMERGENCY)
Facility: OTHER | Age: 34
Discharge: HOME | End: 2024-11-18
Attending: EMERGENCY MEDICINE
Payer: MEDICAID

## 2024-11-18 VITALS
WEIGHT: 189 LBS | HEIGHT: 62 IN | HEART RATE: 61 BPM | SYSTOLIC BLOOD PRESSURE: 127 MMHG | TEMPERATURE: 98 F | OXYGEN SATURATION: 97 % | RESPIRATION RATE: 16 BRPM | DIASTOLIC BLOOD PRESSURE: 77 MMHG | BODY MASS INDEX: 34.78 KG/M2

## 2024-11-18 DIAGNOSIS — R09.A2 FOREIGN BODY SENSATION IN THROAT: Primary | ICD-10-CM

## 2024-11-18 DIAGNOSIS — J02.9 PHARYNGITIS, UNSPECIFIED ETIOLOGY: ICD-10-CM

## 2024-11-18 LAB
CTP QC/QA: YES
CTP QC/QA: YES
GROUP A STREP, MOLECULAR: NEGATIVE
POC MOLECULAR INFLUENZA A AGN: NEGATIVE
POC MOLECULAR INFLUENZA B AGN: NEGATIVE
SARS-COV-2 RDRP RESP QL NAA+PROBE: NEGATIVE

## 2024-11-18 PROCEDURE — 25000003 PHARM REV CODE 250

## 2024-11-18 PROCEDURE — 99283 EMERGENCY DEPT VISIT LOW MDM: CPT | Mod: 25

## 2024-11-18 PROCEDURE — 87635 SARS-COV-2 COVID-19 AMP PRB: CPT

## 2024-11-18 PROCEDURE — 87651 STREP A DNA AMP PROBE: CPT

## 2024-11-18 RX ORDER — CETIRIZINE HYDROCHLORIDE 5 MG/1
10 TABLET ORAL
Status: COMPLETED | OUTPATIENT
Start: 2024-11-18 | End: 2024-11-18

## 2024-11-18 RX ORDER — CETIRIZINE HYDROCHLORIDE 10 MG/1
10 TABLET ORAL DAILY
Qty: 30 TABLET | Refills: 0 | Status: SHIPPED | OUTPATIENT
Start: 2024-11-18 | End: 2025-11-18

## 2024-11-18 RX ADMIN — ALUMINUM HYDROXIDE, MAGNESIUM HYDROXIDE, DIMETHICONE 15 ML: 200; 200; 20 LIQUID ORAL at 09:11

## 2024-11-18 RX ADMIN — CETIRIZINE HYDROCHLORIDE 10 MG: 5 TABLET, FILM COATED ORAL at 09:11

## 2024-11-18 NOTE — ED TRIAGE NOTES
Pt arrived to ED complaining of a sore throat for over a week, no fevers or chills. She has no relief with OTC meds

## 2024-11-18 NOTE — ED PROVIDER NOTES
Encounter Date: 11/18/2024       History     Chief Complaint   Patient presents with    Sore Throat     Pt reporting sore throat x 4-5 days. Denies N/V/D/fever.      This is a 34-year-old female with complaints of sore throat x2 days.  Patient states it feels like something is stuck in her throat.  She does not recall what she was doing when the feeling started.  States that she also has intermittent tingling of her throat that worsens when she swallows over the past couple of days.  She has not taken anything for her symptoms.  No other URI type symptoms such as nasal congestion, headache, fever, cough.  No known allergies.  No known sick contacts.  This is the extent of the patient's complaints at this time.    The history is provided by the patient.     Review of patient's allergies indicates:  No Known Allergies  Past Medical History:   Diagnosis Date    Herpes simplex virus (HSV) infection      Past Surgical History:   Procedure Laterality Date    CYST REMOVAL      right leg     No family history on file.  Social History     Tobacco Use    Smoking status: Never    Smokeless tobacco: Never   Substance Use Topics    Alcohol use: No    Drug use: No     Review of Systems  10 point ROS performed and negative except as stated in HPI   Physical Exam     Initial Vitals [11/18/24 0855]   BP Pulse Resp Temp SpO2   127/77 61 16 97.6 °F (36.4 °C) 97 %      MAP       --         Physical Exam    Constitutional: She appears well-developed and well-nourished.  Non-toxic appearance. She does not appear ill. No distress.   HENT:   Head: Normocephalic and atraumatic. Mouth/Throat: Uvula is midline, oropharynx is clear and moist and mucous membranes are normal. No uvula swelling. No oropharyngeal exudate, posterior oropharyngeal edema, posterior oropharyngeal erythema or tonsillar abscesses.   Eyes: Conjunctivae are normal.   Neck: Trachea normal. Neck supple. No thyroid mass and no thyromegaly present. No stridor present. No  tracheal tenderness present. No tracheal deviation present.   Cardiovascular:  Normal rate and regular rhythm.           Pulmonary/Chest: Effort normal. No tachypnea. No respiratory distress.   Musculoskeletal:      Cervical back: Neck supple.     Neurological: She is alert and oriented to person, place, and time.   Skin: Skin is warm, dry and intact.   Psychiatric: She has a normal mood and affect. Her speech is normal and behavior is normal.         ED Course   Procedures  Labs Reviewed   GROUP A STREP, MOLECULAR       Result Value    Group A Strep, Molecular Negative     SARS-COV-2 RDRP GENE    POC Rapid COVID Negative       Acceptable Yes     POCT INFLUENZA A/B MOLECULAR    POC Molecular Influenza A Ag Negative      POC Molecular Influenza B Ag Negative       Acceptable Yes            Imaging Results              X-Ray Neck Soft Tissue (Final result)  Result time 11/18/24 09:45:57      Final result by Forrest Kramer III, MD (11/18/24 09:45:57)                   Impression:      Normal appearance of the soft tissues of the neck.      Electronically signed by: Forrest Kramer MD  Date:    11/18/2024  Time:    09:45               Narrative:    EXAMINATION:  XR NECK SOFT TISSUE    CLINICAL HISTORY:  Foreign body sensation, throat    FINDINGS:  Two views soft tissue neck.    Prevertebral soft tissues are are unremarkable.  Tonsils and adenoids are normal.  Epiglottis is normal.  No significant bony abnormality seen.  No foreign body seen.                                       Medications   magic mouthwash (diphenhydrAMINE 12.5 mg/5 mL 20 mL, aluminum & magnesium hydroxide-simethicone (MYLANTA) 20 mL, LIDOcaine HCl 2% (XYLOCAINE) 20 mL) solution (15 mLs Swish & Spit Given 11/18/24 0958)   cetirizine tablet 10 mg (10 mg Oral Given 11/18/24 0957)     Medical Decision Making  Urgent evaluation of an afebrile 34-year-old female who presents to the ED with complaints of foreign body  sensation in throat as well as intermittent tingling while swallowing.  Patient appears well nontoxic with stable vital signs.  Posterior oropharynx clear without edema, exudate, or erythema.  No foreign body noted.  Trachea midline without tenderness.  Patient is able to swallow and tolerating her own secretions without difficulty or significant pain.  Doubt food bolus.  No evidence of angioedema or anaphylaxis.  X-ray soft tissue neck shows no evidence of foreign body.  Patient was treated with cetirizine and magic mouthwash with some improvement.  COVID, flu, strep swabs negative.  Plan to continue supportive treatment with cetirizine.  Counseled on need to follow up with PCP for further evaluation if symptoms persist.  Patient educated on signs and symptoms to monitor for and when to return to ED. Patient verbalized understanding agrees with treatment plan. All questions and concerns addressed.    Differential diagnosis includes was not limited to:   Pharyngitis, food bolus, foreign body sensation, allergies    Amount and/or Complexity of Data Reviewed  Labs: ordered.  Radiology: ordered.    Risk  OTC drugs.                                Clinical Impression:  Final diagnoses:  [R09.A2] Foreign body sensation in throat (Primary)  [J02.9] Pharyngitis, unspecified etiology          ED Disposition Condition    Discharge Stable          ED Prescriptions       Medication Sig Dispense Start Date End Date Auth. Provider    cetirizine (ZYRTEC) 10 MG tablet Take 1 tablet (10 mg total) by mouth once daily. 30 tablet 11/18/2024 11/18/2025 Radha Lerner PA-C          Follow-up Information       Follow up With Specialties Details Why Contact Info    [facility] - Emergency Dept Emergency Medicine Go to  If symptoms worsen [address] [facility] [id number] [phone]             Radha Lerner PA-C  11/18/24 1653

## 2025-01-12 ENCOUNTER — HOSPITAL ENCOUNTER (EMERGENCY)
Facility: OTHER | Age: 35
Discharge: HOME | End: 2025-01-12
Attending: EMERGENCY MEDICINE
Payer: MEDICAID

## 2025-01-12 VITALS
HEIGHT: 63 IN | SYSTOLIC BLOOD PRESSURE: 127 MMHG | BODY MASS INDEX: 31.71 KG/M2 | WEIGHT: 179 LBS | RESPIRATION RATE: 18 BRPM | OXYGEN SATURATION: 99 % | TEMPERATURE: 98 F | DIASTOLIC BLOOD PRESSURE: 80 MMHG | HEART RATE: 85 BPM

## 2025-01-12 DIAGNOSIS — K62.5 BRBPR (BRIGHT RED BLOOD PER RECTUM): Primary | ICD-10-CM

## 2025-01-12 DIAGNOSIS — K59.00 CONSTIPATION: ICD-10-CM

## 2025-01-12 LAB
B-HCG UR QL: NEGATIVE
CTP QC/QA: YES

## 2025-01-12 PROCEDURE — 99283 EMERGENCY DEPT VISIT LOW MDM: CPT | Mod: 25

## 2025-01-12 PROCEDURE — 81025 URINE PREGNANCY TEST: CPT | Performed by: NURSE PRACTITIONER

## 2025-01-12 RX ORDER — SYRING-NEEDL,DISP,INSUL,0.3 ML 29 G X1/2"
296 SYRINGE, EMPTY DISPOSABLE MISCELLANEOUS ONCE
Qty: 296 ML | Refills: 0 | Status: SHIPPED | OUTPATIENT
Start: 2025-01-12 | End: 2025-01-12

## 2025-01-12 RX ORDER — POLYETHYLENE GLYCOL 3350 17 G/17G
POWDER, FOR SOLUTION ORAL
Qty: 100 EACH | Refills: 0 | Status: SHIPPED | OUTPATIENT
Start: 2025-01-12

## 2025-01-12 RX ORDER — HYDROCORTISONE ACETATE 25 MG/1
25 SUPPOSITORY RECTAL 2 TIMES DAILY
Qty: 20 SUPPOSITORY | Refills: 0 | Status: SHIPPED | OUTPATIENT
Start: 2025-01-12 | End: 2025-01-22

## 2025-01-12 NOTE — FIRST PROVIDER EVALUATION
Emergency Department TeleTriage Encounter Note      CHIEF COMPLAINT    Chief Complaint   Patient presents with    Constipation     Did a hydrogen peroxide enema yesterday after being constipated for 24 hours. Now reports ribbon-like stools and rectal pressure.       VITAL SIGNS   Initial Vitals [01/12/25 1118]   BP Pulse Resp Temp SpO2   127/80 85 18 98.3 °F (36.8 °C) 99 %      MAP       --            ALLERGIES    Review of patient's allergies indicates:  No Known Allergies    PROVIDER TRIAGE NOTE  Unable to have a full bowel movement for the past four days, is having small amount of stool and is straining. Denies vomiting and abdominal pain. Did an enema and this caused her to have rectal pain.    Limited physical exam via telehealth: The patient is awake, alert, answering questions appropriately and is not in respiratory distress.  As the Teletriage provider, I performed an initial assessment and ordered appropriate labs and imaging studies, if any, to facilitate the patient's care once placed in the ED. Once a room is available, care and a full evaluation will be completed by an alternate ED provider.  Any additional orders and the final disposition will be determined by that provider.  All imaging and labs will not be followed-up by the Teletriage Team, including myself.          ORDERS  Labs Reviewed - No data to display    ED Orders (720h ago, onward)      Start Ordered     Status Ordering Provider    01/12/25 1135 01/12/25 1134  POCT urine pregnancy  Once         Ordered CYRUS DURAN    01/12/25 1135 01/12/25 1134  X-Ray Abdomen Flat And Erect  1 time imaging         Ordered CYRUS DURAN              Virtual Visit Note: The provider triage portion of this emergency department evaluation and documentation was performed via "[employer] a HIPAA-compliant telemedicine application, in concert with a tele-presenter in the room. A face to face patient evaluation with one of my colleagues will occur once  the patient is placed in an emergency department room.      DISCLAIMER: This note was prepared with [employer] voice recognition transcription software. Garbled syntax, mangled pronouns, and other bizarre constructions may be attributed to that software system.

## 2025-01-12 NOTE — ED PROVIDER NOTES
Encounter Date: 1/12/2025       History     Chief Complaint   Patient presents with    Constipation     Did a hydrogen peroxide enema yesterday after being constipated for 24 hours. Now reports ribbon-like stools and rectal pressure.     This is a pleasant 34 yo woman presenting for evaluation of constipation and rectal bleeding over 3-4 days for which she used 5-10 ML of hydrogen peroxide as an enema with some blood thereafter. She is someone who normally has an every other day BM. She denies fevers or chills, she endorses some abdominal cramping. She can not recall anything like this in the past. The blood appears to be watery and mucus filled with blood on the TP thereafter.     The history is provided by the patient and medical records.     Review of patient's allergies indicates:  No Known Allergies  Past Medical History:   Diagnosis Date    Herpes simplex virus (HSV) infection      Past Surgical History:   Procedure Laterality Date    CYST REMOVAL      right leg     No family history on file.  Social History     Tobacco Use    Smoking status: Never    Smokeless tobacco: Never   Substance Use Topics    Alcohol use: No    Drug use: No     Review of Systems  Constitutional-no fever  HEENT-no congestion  Eyes-no redness  Respiratory-no shortness of breath  Cardio-no chest pain  GI- + constiaption   Endocrine-no cold intolerance  -no difficulty urinating  MSK-no myalgias  Skin-no rashes  Allergy-no environmental allergy  Neurologic-, no headache  Hematology-no swollen nodes  Behavioral-no confusion   Physical Exam     Initial Vitals [01/12/25 1118]   BP Pulse Resp Temp SpO2   127/80 85 18 98.3 °F (36.8 °C) 99 %      MAP       --         Physical Exam  Constitutional: Well appearing, no distress.  Eyes: Conjunctivae normal.  ENT       Head: Normocephalic, atraumatic.       Nose: Normal external appearance        Mouth/Throat: no strigulous respirations   Hematological/Lymphatic/Immunilogical: no visible  lymphadenopathy   Cardiovascular: Normal rate,   Respiratory: Normal respiratory effort.   Gastrointestinal: non distended   Rectal- chaperone present- normal anal verge, soft stool on JARAD  Musculoskeletal: Normal range of motion in all extremities. No obvious deformities or swelling.  Neurologic: Alert, oriented. Normal speech and language. No gross focal neurologic deficits are appreciated.  Skin: Skin is warm, dry. No rash noted.  Psychiatric: Mood and affect are normal.    ED Course   Procedures  Labs Reviewed   POCT URINE PREGNANCY       Result Value    POC Preg Test, Ur Negative       Acceptable Yes            Imaging Results              X-Ray Abdomen Flat And Erect (Final result)  Result time 01/12/25 16:34:07      Final result by Philippe Gomez MD (01/12/25 16:34:07)                   Impression:      Nonspecific nonobstructive abdomen.      Electronically signed by: Philippe Gomez MD  Date:    01/12/2025  Time:    16:34               Narrative:    EXAMINATION:  XR ABDOMEN FLAT AND ERECT    CLINICAL HISTORY:  Constipation, unspecified    TECHNIQUE:  Flat and erect AP views of the abdomen were performed.    COMPARISON:  None    FINDINGS:  Bowel gas pattern is nonobstructive.  No suspicious mass-effect or calcifications present.   Osseous structures are intact.  Lung bases clear.                                    X-Rays:   Independently Interpreted Readings:   Other Readings:  Xr abdomen- non obstructive bowel gas pattern    Medications - No data to display  Medical Decision Making  Abdominal pain represents a profoundly broad differential, this patient's symptoms are nondescript in nature and while unlikely could represent-  Pancreatitis, cholecystitis, appendicitis, gastritis, cystitis, pyleonephritis, PUD, obstructions constipation neoplasm among a myriad of other diagnoses.     A thorough examination and history was undertaken and appropriate diagnostics ordered with a diagnosis  identified as below based on summative findings.   Because the broad differential in potential for changes in symptoms and discussion was also undertaken related to the importance of follow-up return or acknowledgement of new or changes in symptoms.       Problems Addressed:  BRBPR (bright red blood per rectum): acute illness or injury  Constipation: acute illness or injury    Amount and/or Complexity of Data Reviewed  External Data Reviewed: labs and notes.     Details: Hx of sinusitis and tonsillitis   Labs: ordered. Decision-making details documented in ED Course.  Radiology: ordered and independent interpretation performed. Decision-making details documented in ED Course.    Risk  OTC drugs.  Prescription drug management.  Diagnosis or treatment significantly limited by social determinants of health.               ED Course as of 25 1127   Sun 2025   1657 JARAD shows no impaction, soft stool, no blood [TK]      ED Course User Index  [TK] Nj Cortez MD                           Clinical Impression:  Final diagnoses:  [K59.00] Constipation  [K62.5] BRBPR (bright red blood per rectum) (Primary)          ED Disposition Condition    Discharge Stable          ED Prescriptions       Medication Sig Dispense Start Date End Date Auth. Provider    hydrocortisone (ANUSOL-HC) 25 mg suppository Place 1 suppository (25 mg total) rectally 2 (two) times daily. for 10 days 20 suppository 2025 Nj Cortez MD    polyethylene glycol (GLYCOLAX) 17 gram PwPk Use 17g per hour until you have a bowel movement, if no BM in 4 hours, use laxative 100 each 2025 -- Nj Cortez MD    magnesium citrate solution () Take 296 mLs by mouth once. for 1 dose 296 mL 2025 Nj Cortez MD          Follow-up Information       Follow up With Specialties Details Why Contact Info    [facility] - Emergency Dept Emergency Medicine Go to  As needed, For a follow up visit  about today [address] [facility] [id number]  [phone]             Nj Cortez MD  01/13/25 1127

## 2025-01-12 NOTE — ED TRIAGE NOTES
Pt arrived to ED complaining of constipation for four days, used peroxide for enema yesterday with no relief but now has rectal pain she denies abdominal pain at this time.

## 2025-01-12 NOTE — DISCHARGE INSTRUCTIONS
Mrs. Urbano,    Thank you for letting me care for you today! It was nice meeting you, and I hope you feel better soon.   If you would like access to your chart and what was done today please utilize the [facility].   Please come back to [facility] for all of your future medical needs.    Our goal in the emergency department is to always give you outstanding care and exceptional service. You may receive a survey by mail or e-mail in the next week regarding your experience in our ED. We would greatly appreciate you completing and returning the survey. Your feedback provides us with a way to recognize our staff who give very good care and it helps us learn how to improve when your experience was below our aspiration of excellence.     Sincerely,    Nj Cortez MD  Board Certified Emergency Physician

## 2025-01-12 NOTE — ED NOTES
Pt changed into clothes, ready for discharge. Pt  did  not stay for vitals prior to departure. Pt stable, nad, no questions asked regarding dc medications and follow-up.

## 2025-04-04 ENCOUNTER — HOSPITAL ENCOUNTER (EMERGENCY)
Facility: OTHER | Age: 35
Discharge: HOME | End: 2025-04-04
Attending: EMERGENCY MEDICINE
Payer: MEDICAID

## 2025-04-04 VITALS
HEART RATE: 102 BPM | HEIGHT: 63 IN | DIASTOLIC BLOOD PRESSURE: 71 MMHG | BODY MASS INDEX: 35.44 KG/M2 | OXYGEN SATURATION: 99 % | SYSTOLIC BLOOD PRESSURE: 117 MMHG | RESPIRATION RATE: 16 BRPM | TEMPERATURE: 98 F | WEIGHT: 200 LBS

## 2025-04-04 DIAGNOSIS — A59.9 TRICHOMONAS INFECTION: ICD-10-CM

## 2025-04-04 DIAGNOSIS — O30.049 DICHORIONIC DIAMNIOTIC TWIN PREGNANCY, ANTEPARTUM: ICD-10-CM

## 2025-04-04 DIAGNOSIS — O26.899 ABDOMINAL PAIN DURING PREGNANCY, ANTEPARTUM: Primary | ICD-10-CM

## 2025-04-04 DIAGNOSIS — Z3A.16 16 WEEKS GESTATION OF PREGNANCY: ICD-10-CM

## 2025-04-04 DIAGNOSIS — R10.9 ABDOMINAL PAIN DURING PREGNANCY, ANTEPARTUM: Primary | ICD-10-CM

## 2025-04-04 LAB
ABSOLUTE EOSINOPHIL (OHS): 0.09 K/UL
ABSOLUTE MONOCYTE (OHS): 0.49 K/UL (ref 0.3–1)
ABSOLUTE NEUTROPHIL COUNT (OHS): 5.24 K/UL (ref 1.8–7.7)
ALBUMIN SERPL BCP-MCNC: 2.8 G/DL (ref 3.5–5.2)
ALP SERPL-CCNC: 47 UNIT/L (ref 40–150)
ALT SERPL W/O P-5'-P-CCNC: 10 UNIT/L (ref 10–44)
ANION GAP (OHS): 8 MMOL/L (ref 8–16)
AST SERPL-CCNC: 18 UNIT/L (ref 11–45)
B-HCG UR QL: POSITIVE
BACTERIA #/AREA URNS AUTO: ABNORMAL /HPF
BASOPHILS # BLD AUTO: 0.02 K/UL
BASOPHILS NFR BLD AUTO: 0.2 %
BILIRUB SERPL-MCNC: 0.2 MG/DL (ref 0.1–1)
BILIRUB UR QL STRIP.AUTO: NEGATIVE
BUN SERPL-MCNC: 5 MG/DL (ref 6–20)
CALCIUM SERPL-MCNC: 8.4 MG/DL (ref 8.7–10.5)
CHLORIDE SERPL-SCNC: 105 MMOL/L (ref 95–110)
CLARITY UR: ABNORMAL
CO2 SERPL-SCNC: 18 MMOL/L (ref 23–29)
COLOR UR AUTO: YELLOW
CREAT SERPL-MCNC: 0.6 MG/DL (ref 0.5–1.4)
CTP QC/QA: YES
ERYTHROCYTE [DISTWIDTH] IN BLOOD BY AUTOMATED COUNT: 14.4 % (ref 11.5–14.5)
GFR SERPLBLD CREATININE-BSD FMLA CKD-EPI: >60 ML/MIN/1.73/M2
GLUCOSE SERPL-MCNC: 100 MG/DL (ref 70–110)
GLUCOSE UR QL STRIP: NEGATIVE
HBV SURFACE AB SER-ACNC: >1000 MIU/ML
HBV SURFACE AB SERPL IA-ACNC: REACTIVE M[IU]/ML
HCG INTACT+B SERPL-ACNC: NORMAL MIU/ML
HCT VFR BLD AUTO: 33.5 % (ref 37–48.5)
HCV AB SERPL QL IA: NEGATIVE
HGB BLD-MCNC: 11.3 GM/DL (ref 12–16)
HGB UR QL STRIP: ABNORMAL
HIV 1+2 AB+HIV1 P24 AG SERPL QL IA: NEGATIVE
IMM GRANULOCYTES # BLD AUTO: 0.02 K/UL (ref 0–0.04)
IMM GRANULOCYTES NFR BLD AUTO: 0.2 % (ref 0–0.5)
INDIRECT COOMBS: NORMAL
KETONES UR QL STRIP: ABNORMAL
LEUKOCYTE ESTERASE UR QL STRIP: ABNORMAL
LYMPHOCYTES # BLD AUTO: 2.36 K/UL (ref 1–4.8)
MCH RBC QN AUTO: 27.4 PG (ref 27–31)
MCHC RBC AUTO-ENTMCNC: 33.7 G/DL (ref 32–36)
MCV RBC AUTO: 81 FL (ref 82–98)
MICROSCOPIC COMMENT: ABNORMAL
NITRITE UR QL STRIP: NEGATIVE
NUCLEATED RBC (/100WBC) (OHS): 0 /100 WBC
PH UR STRIP: 7 [PH]
PLATELET # BLD AUTO: 197 K/UL (ref 150–450)
PLATELET BLD QL SMEAR: ABNORMAL
PMV BLD AUTO: 10.2 FL (ref 9.2–12.9)
POTASSIUM SERPL-SCNC: 4 MMOL/L (ref 3.5–5.1)
PROT SERPL-MCNC: 6.7 GM/DL (ref 6–8.4)
PROT UR QL STRIP: NEGATIVE
RBC # BLD AUTO: 4.13 M/UL (ref 4–5.4)
RELATIVE EOSINOPHIL (OHS): 1.1 %
RELATIVE LYMPHOCYTE (OHS): 28.7 % (ref 18–48)
RELATIVE MONOCYTE (OHS): 6 % (ref 4–15)
RELATIVE NEUTROPHIL (OHS): 63.8 % (ref 38–73)
RH BLD: NORMAL
SODIUM SERPL-SCNC: 131 MMOL/L (ref 136–145)
SP GR UR STRIP: 1.02
SPECIMEN OUTDATE: NORMAL
T PALLIDUM IGG+IGM SER QL: NEGATIVE
TRICHOMONAS UR QL COMP ASSIST: ABNORMAL /HPF
UROBILINOGEN UR STRIP-ACNC: 1 EU/DL
V.ZOSTER IGG INTERP (OHS): POSITIVE
VARICELLA ZOSTER IGG (OHS): 9.25 S/CO
WBC # BLD AUTO: 8.22 K/UL (ref 3.9–12.7)
WBC #/AREA URNS AUTO: >100 /HPF (ref 0–5)
WBC CLUMPS UR QL AUTO: ABNORMAL

## 2025-04-04 PROCEDURE — 80053 COMPREHEN METABOLIC PANEL: CPT | Performed by: EMERGENCY MEDICINE

## 2025-04-04 PROCEDURE — 81025 URINE PREGNANCY TEST: CPT | Performed by: EMERGENCY MEDICINE

## 2025-04-04 PROCEDURE — 85025 COMPLETE CBC W/AUTO DIFF WBC: CPT | Performed by: EMERGENCY MEDICINE

## 2025-04-04 PROCEDURE — 87491 CHLMYD TRACH DNA AMP PROBE: CPT

## 2025-04-04 PROCEDURE — 86787 VARICELLA-ZOSTER ANTIBODY: CPT

## 2025-04-04 PROCEDURE — 99284 EMERGENCY DEPT VISIT MOD MDM: CPT | Mod: 25

## 2025-04-04 PROCEDURE — 86901 BLOOD TYPING SEROLOGIC RH(D): CPT

## 2025-04-04 PROCEDURE — 87086 URINE CULTURE/COLONY COUNT: CPT | Performed by: EMERGENCY MEDICINE

## 2025-04-04 PROCEDURE — 84702 CHORIONIC GONADOTROPIN TEST: CPT | Performed by: EMERGENCY MEDICINE

## 2025-04-04 PROCEDURE — 99284 EMERGENCY DEPT VISIT MOD MDM: CPT | Mod: 25,,, | Performed by: OBSTETRICS & GYNECOLOGY

## 2025-04-04 PROCEDURE — 76815 OB US LIMITED FETUS(S): CPT | Mod: 26,,, | Performed by: OBSTETRICS & GYNECOLOGY

## 2025-04-04 PROCEDURE — 86803 HEPATITIS C AB TEST: CPT | Performed by: EMERGENCY MEDICINE

## 2025-04-04 PROCEDURE — 86706 HEP B SURFACE ANTIBODY: CPT

## 2025-04-04 PROCEDURE — 87389 HIV-1 AG W/HIV-1&-2 AB AG IA: CPT | Performed by: EMERGENCY MEDICINE

## 2025-04-04 PROCEDURE — 86593 SYPHILIS TEST NON-TREP QUANT: CPT

## 2025-04-04 PROCEDURE — 86762 RUBELLA ANTIBODY: CPT

## 2025-04-04 PROCEDURE — 25000003 PHARM REV CODE 250

## 2025-04-04 PROCEDURE — 81003 URINALYSIS AUTO W/O SCOPE: CPT | Performed by: EMERGENCY MEDICINE

## 2025-04-04 RX ORDER — ACETAMINOPHEN 500 MG
1000 TABLET ORAL ONCE
Status: COMPLETED | OUTPATIENT
Start: 2025-04-04 | End: 2025-04-04

## 2025-04-04 RX ORDER — HYDROMORPHONE HYDROCHLORIDE 1 MG/ML
0.5 INJECTION, SOLUTION INTRAMUSCULAR; INTRAVENOUS; SUBCUTANEOUS EVERY 30 MIN PRN
Refills: 0 | Status: DISCONTINUED | OUTPATIENT
Start: 2025-04-04 | End: 2025-04-04 | Stop reason: HOSPADM

## 2025-04-04 RX ORDER — METRONIDAZOLE 500 MG/1
2000 TABLET ORAL ONCE
Qty: 4 TABLET | Refills: 0 | Status: SHIPPED | OUTPATIENT
Start: 2025-04-04 | End: 2025-04-04

## 2025-04-04 RX ORDER — METRONIDAZOLE 500 MG/1
500 TABLET ORAL EVERY 12 HOURS
Qty: 28 TABLET | Refills: 0 | Status: SHIPPED | OUTPATIENT
Start: 2025-04-04 | End: 2025-04-18

## 2025-04-04 RX ADMIN — ACETAMINOPHEN 1000 MG: 500 TABLET ORAL at 03:04

## 2025-04-04 NOTE — ED PROVIDER NOTES
"  Source of History:  Medical record, patient      Chief complaint:  Per triage note: "Abdominal Pain (Pt reports L side and lower abd pain x 1 week. Pt denies taking any OTC medications. Pt states pain makes it hard to sleep. )  "    HPI:    Patient presents for evaluation of left sided abdominal pain for the last 5 days associated with several weeks of nausea.  She denies any recent vomiting.  She denies any sick contacts, travel, suspicious intake, diarrhea.  She has not taken anything for these symptoms.  She reports her last menstrual period was in December, can not recall, but chart documents or LMP as December 11.  The pain is not clearly improved or worsened by anything.      ROS:   See HPI for pertinent review of systems      Review of patient's allergies indicates:  No Known Allergies    PMH:  As per HPI and below:  Past Medical History:   Diagnosis Date    Herpes simplex virus (HSV) infection        Past Surgical History:   Procedure Laterality Date    CYST REMOVAL      right leg       Social History[1]    Physical Exam:      Nursing note and vitals reviewed.  /71   Pulse 102   Temp 98.1 °F (36.7 °C)   Resp 16   Ht 5' 3" (1.6 m)   Wt 90.7 kg (200 lb)   LMP 12/11/2024   SpO2 99%   BMI 35.43 kg/m²     Constitutional: No distress.  Eyes: EOMI. No discharge. Anicteric.  HENT:   Neck: Normal range of motion. Neck supple.  Cardiovascular: Normal rate. No murmur, no gallop and no friction rub heard.   Pulmonary/Chest: No respiratory distress. Effort normal. No wheezes, no rales, no rhonchi.   Abdominal: Bowel sounds normal. Soft.  Gravid. There is focal left lower quadrant tenderness. There is no rebound, no guarding, no tenderness at McBurney's point.  Neurological: GCS 15. Alert and oriented to person, place, and time. No gross cranial nerve, light touch or strength deficit. Coordination normal.   Skin: Skin is warm and dry.   EXT: 2+ radial pulses.   Bedside ultrasound showed twin gestation, " both with vigorous movement, normal appearing heart rate.        Medical Decision Making / Independent Interpretations / External Records Reviewed:      Patient is a 35-year-old female  F LMP 24 who presents for evaluation of left lower quadrant abdominal pain for last 5 days associated with several weeks of nausea.  She denies any associated fevers, sick contacts, travel, suspicious intake.  She denies any vaginal bleeding.  She denies any urinary symptoms.  The initial differential included ectopic pregnancy, miscarriage, diverticulitis.   Patient had a positive urine pregnancy test.  Patient reports she did not suspect that she was pregnant.  Bedside ultrasound showed second trimester twin gestation of approximately 16 weeks.   Patient history, findings, results, patient management discussed with OBGYN resident Mario who states patient can be transferred to OB-ED.                    Procedures    --  I decided to obtain the patient's medical records. I reviewed patient's prior external notes / results:  specialist documentation   and pharmacy / prescription records.   --  Additional Medical Decision Making: Parenteral controlled substance ordered or considered and Decision regarding advanced imaging    Pt seen at a time of critical national shortage of IV fluids, affecting decision making and treatment considerations.       Medications   HYDROmorphone injection 0.5 mg (has no administration in time range)              No future appointments.       Diagnostic Impression:    1. Abdominal pain during pregnancy, antepartum    2. Dichorionic diamniotic twin pregnancy, antepartum                        [1]   Social History  Tobacco Use    Smoking status: Never    Smokeless tobacco: Never   Substance Use Topics    Alcohol use: No    Drug use: No        Wilmer Campbell MD  25 0109
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
## 2025-04-04 NOTE — DISCHARGE INSTRUCTIONS
Call Labor Unit anytime with questions or concerns at [phone]      Medications that are safe during pregnancy:  For pain/fever: Tylenol (acetaminophen)   For allergy symptoms (sneezing, watery eyes, itching): Benadryl or regular Claritin  For nasal congestion: regular Sudafed (do not take if you have high blood pressure.   If you have high blood pressure, you make take Coricidin HBP  For cough: regular Robitussin  For sleep: Benadryl       DANGER SIGNS DURING PREGNANCY    Sometimes problems happen during pregnancy. You need to come to the hospital right away if any of these things happen.  Abdominal pain that does not go away  Baby not moving at all or as much in late pregnancy (see kick counts section)  Blurred vision or spots before your eyes  Dizziness or fainting  Fever  Pain or burning when you urinate  Regular contractions  Severe or continuous headaches  Swelling of the hands of face  Vaginal bleeding  Vaginal blisters or sores  Vomiting  Water breaks or leaks (sudden or slow loss of fluid from the vagina)  PREMATURE BIRTHS  Most babies are born at term (40 weeks). Babies born before 37 weeks are considered premature (born too soon). Babies born too soon may have trouble breathing and staying warm. They need lots of special care.  PREMATURITY IS A SERIOUS RISK TO YOUR BABY.  It is VERY IMPORTANT to come to the hospital if you think your baby may be trying to come too early.  These are the signs of premature labor:  Labor pains or contractions  Low backache  Menstrual cramps  Stomach cramps (with or without diarrhea)  Pelvic pressure (feels like the baby is pushing down or trying to fall out)  Change in discharge from the vagina (sudden increase in discharge or becomes more watery or slightly bloody; panties stay wet or wet in the morning)  Vaginal bleeding or spotting        Pregnancy-safe Medications: (over-the-counter)    Nausea: Pepto Bismol, Emerol, Vitamin B-6 (25-50mg twice a day).  Constipation:  Fibercon, Dialose, Metamucil, Colace, Milk of Magnesia  Indigestion: Mylanta, Maalox, Gaviscon, Tums, Zantac ( mg once or twice a day)  Diarrhea: Pepto Bismol, Kaopectate, Imodium AD  Headache: Tylenol, Extra-strength Tylenol  Cold/Cough/Congestion: Tylenol Cold & Sinus, Robitussin DM (cough)    Aspirin and Ibuprofen should be AVOIDED.    If you are not sure about an over-the-counter medication, please call the Labor Unit at [phone]
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
## 2025-04-05 LAB — BACTERIA UR CULT: NORMAL

## 2025-04-07 LAB
RUBV IGG SER-ACNC: 13.1 IU/ML
RUBV IGG SER-IMP: REACTIVE

## 2025-04-12 LAB
C TRACH DNA SPEC QL NAA+PROBE: NOT DETECTED
CTGC SOURCE (OHS) ORD-325: NORMAL
N GONORRHOEA DNA UR QL NAA+PROBE: NOT DETECTED